# Patient Record
Sex: FEMALE | Race: WHITE | NOT HISPANIC OR LATINO | Employment: UNEMPLOYED | URBAN - METROPOLITAN AREA
[De-identification: names, ages, dates, MRNs, and addresses within clinical notes are randomized per-mention and may not be internally consistent; named-entity substitution may affect disease eponyms.]

---

## 2021-10-06 ENCOUNTER — HOSPITAL ENCOUNTER (OUTPATIENT)
Dept: GENERAL RADIOLOGY | Facility: HOSPITAL | Age: 56
Discharge: HOME | End: 2021-10-06
Admitting: FAMILY MEDICINE

## 2021-10-06 ENCOUNTER — TRANSCRIBE ORDERS (OUTPATIENT)
Dept: ADMINISTRATIVE | Facility: HOSPITAL | Age: 56
End: 2021-10-06

## 2021-10-06 ENCOUNTER — TRANSCRIBE ORDERS (OUTPATIENT)
Dept: MAMMOGRAPHY | Facility: HOSPITAL | Age: 56
End: 2021-10-06

## 2021-10-06 DIAGNOSIS — R05.8 PRODUCTIVE COUGH: Primary | ICD-10-CM

## 2021-10-06 DIAGNOSIS — R05.8 PRODUCTIVE COUGH: ICD-10-CM

## 2021-10-06 DIAGNOSIS — T85.848A PAIN DUE TO ANY DEVICE, IMPLANT OR GRAFT, INITIAL ENCOUNTER: Primary | ICD-10-CM

## 2021-10-06 PROCEDURE — 71046 X-RAY EXAM CHEST 2 VIEWS: CPT

## 2021-11-10 ENCOUNTER — HOSPITAL ENCOUNTER (OUTPATIENT)
Dept: ULTRASOUND IMAGING | Facility: HOSPITAL | Age: 56
Discharge: HOME | End: 2021-11-10

## 2021-11-10 ENCOUNTER — HOSPITAL ENCOUNTER (OUTPATIENT)
Dept: MAMMOGRAPHY | Facility: HOSPITAL | Age: 56
Discharge: HOME | End: 2021-11-10

## 2021-11-10 DIAGNOSIS — T85.848A PAIN DUE TO ANY DEVICE, IMPLANT OR GRAFT, INITIAL ENCOUNTER: ICD-10-CM

## 2021-11-10 PROCEDURE — 77066 DX MAMMO INCL CAD BI: CPT | Performed by: RADIOLOGY

## 2021-11-10 PROCEDURE — G0279 TOMOSYNTHESIS, MAMMO: HCPCS

## 2021-11-10 PROCEDURE — 76642 ULTRASOUND BREAST LIMITED: CPT | Performed by: RADIOLOGY

## 2021-11-10 PROCEDURE — 77062 BREAST TOMOSYNTHESIS BI: CPT | Performed by: RADIOLOGY

## 2021-11-10 PROCEDURE — 77066 DX MAMMO INCL CAD BI: CPT

## 2021-11-10 PROCEDURE — 76642 ULTRASOUND BREAST LIMITED: CPT

## 2024-03-28 ENCOUNTER — OFFICE VISIT (OUTPATIENT)
Dept: NEUROSURGERY | Facility: CLINIC | Age: 59
End: 2024-03-28
Payer: COMMERCIAL

## 2024-03-28 VITALS
DIASTOLIC BLOOD PRESSURE: 80 MMHG | SYSTOLIC BLOOD PRESSURE: 120 MMHG | WEIGHT: 101.6 LBS | TEMPERATURE: 97.7 F | BODY MASS INDEX: 18.58 KG/M2

## 2024-03-28 DIAGNOSIS — G93.0 ARACHNOID CYST: Primary | ICD-10-CM

## 2024-03-28 PROCEDURE — 99203 OFFICE O/P NEW LOW 30 MIN: CPT | Performed by: NEUROLOGICAL SURGERY

## 2024-03-28 RX ORDER — TRIAMCINOLONE ACETONIDE 0.1 %
1 PASTE (GRAM) DENTAL
COMMUNITY
Start: 2024-02-27

## 2024-03-28 RX ORDER — ROSUVASTATIN CALCIUM 20 MG/1
TABLET, COATED ORAL
COMMUNITY
Start: 2024-02-28

## 2024-03-28 RX ORDER — ESTRADIOL 0.1 MG/G
CREAM VAGINAL
COMMUNITY

## 2024-03-28 RX ORDER — MULTIVITAMIN
1 CAPSULE ORAL DAILY
COMMUNITY

## 2024-03-28 RX ORDER — DICYCLOMINE HYDROCHLORIDE 10 MG/1
CAPSULE ORAL DAILY
COMMUNITY

## 2024-03-28 RX ORDER — IBUPROFEN 200 MG
500 TABLET ORAL EVERY 6 HOURS PRN
COMMUNITY

## 2024-03-28 RX ORDER — VALACYCLOVIR HYDROCHLORIDE 500 MG/1
TABLET, FILM COATED ORAL
COMMUNITY
Start: 2024-02-27

## 2024-03-28 NOTE — PROGRESS NOTES
"  NAME: BUNNY DRIVER   DOS: 3/28/2024  : 1965  PCP: Jennifer Carrillo APRN    Chief Complaint:    Chief Complaint   Patient presents with    Headache       History of Present Illness:  58 y.o. female   Saw this 58-year-old female neurosurgical consultation.  She presents with a history of headaches she is moved here from [place] about 3 years ago during COVID with a job and has a longstanding history of some carpal tunnel and headaches but the presentation headache has gotten worse she has had more retro-orbital pain pain in the right occipital area with radiation into the right orbit she also has increasing numbness and tingling in the fingers and vibratory sensation are in her feet she intermittently takes ibuprofen she has been seen by neurology in the past and is here for evaluation for a \"arachnoid cyst \"    PMHX  Allergies:  No Known Allergies  Medications    Current Outpatient Medications:     cyanocobalamin (VITAMIN B-12) 50 MCG tablet, Take  by mouth., Disp: , Rfl:     dicyclomine (BENTYL) 10 MG capsule, Take  by mouth Daily., Disp: , Rfl:     estradiol (ESTRACE) 0.1 MG/GM vaginal cream, , Disp: , Rfl:     ibuprofen (ADVIL,MOTRIN) 200 MG tablet, Take 2.5 tablets by mouth Every 6 (Six) Hours As Needed., Disp: , Rfl:     Multiple Vitamin (multivitamin) capsule, Take 1 capsule by mouth Daily., Disp: , Rfl:     nystatin-triamcinolone (MYCOLOG II) 645000-7.1 UNIT/GM-% cream, , Disp: , Rfl:     rosuvastatin (CRESTOR) 20 MG tablet, Take  by mouth., Disp: , Rfl:     triamcinolone (KENALOG) 0.1 % paste, Apply 1 Application to teeth., Disp: , Rfl:     valACYclovir (VALTREX) 500 MG tablet, , Disp: , Rfl:     Zinc Gluconate 10 MG lozenge, Take 1 lozenge by mouth Daily., Disp: , Rfl:   Past Medical History:  Past Medical History:   Diagnosis Date    Headache      Past Surgical History:  Past Surgical History:   Procedure Laterality Date    AUGMENTATION MAMMAPLASTY       Social Hx:  Social History     Tobacco Use "    Smoking status: Former     Types: Cigarettes    Smokeless tobacco: Never   Vaping Use    Vaping status: Never Used   Substance Use Topics    Drug use: Never     Family Hx:  Family History   Problem Relation Age of Onset    Cancer Mother     Heart disease Father     Ovarian cancer Maternal Aunt     Breast cancer Maternal Grandmother      Review of Systems:        Review of Systems   Constitutional:  Negative for activity change, appetite change, chills, diaphoresis, fatigue, fever and unexpected weight change.   HENT:  Positive for congestion, mouth sores and postnasal drip. Negative for dental problem, drooling, ear discharge, ear pain, facial swelling, hearing loss, nosebleeds, rhinorrhea, sinus pressure, sinus pain, sneezing, sore throat, tinnitus, trouble swallowing and voice change.    Eyes:  Negative for photophobia, pain, discharge, redness, itching and visual disturbance.   Respiratory:  Negative for apnea, cough, choking, chest tightness, shortness of breath, wheezing and stridor.    Cardiovascular:  Negative for chest pain, palpitations and leg swelling.   Gastrointestinal:  Negative for abdominal distention, abdominal pain, anal bleeding, blood in stool, constipation, diarrhea, nausea, rectal pain and vomiting.   Endocrine: Negative for cold intolerance, heat intolerance, polydipsia, polyphagia and polyuria.   Genitourinary:  Positive for dyspareunia and frequency. Negative for decreased urine volume, difficulty urinating, dysuria, enuresis, flank pain, genital sores, hematuria and urgency.   Musculoskeletal:  Positive for back pain. Negative for arthralgias, gait problem, joint swelling, myalgias, neck pain and neck stiffness.   Skin:  Negative for color change, pallor, rash and wound.   Allergic/Immunologic: Negative for environmental allergies, food allergies and immunocompromised state.   Neurological:  Negative for dizziness, tremors, seizures, syncope, facial asymmetry, speech difficulty,  weakness, light-headedness, numbness and headaches.   Hematological:  Negative for adenopathy. Does not bruise/bleed easily.   Psychiatric/Behavioral:  Negative for agitation, behavioral problems, confusion, decreased concentration, dysphoric mood, hallucinations, self-injury, sleep disturbance and suicidal ideas. The patient is not nervous/anxious and is not hyperactive.       I have reviewed this note template and all pertinent parts of the review of systems social, family history, surgical history and medication list      Physical Examination:  Vitals:    03/28/24 1118   BP: 120/80   Temp: 97.7 °F (36.5 °C)      General Appearance:   Well developed, well nourished, well groomed, alert, and cooperative.  She appears in no apparent distress to stated age  Neurological examination:  Neurologic Exam vital signs were reviewed and documented in the chart  Patient appeared in good neurologic function with normal comprehension fluent speech  Mood and affect are normal  Sense of smell deferred    Pupils symmetric equally reactive funduscopic exam not visualized   Visual fields intact to confrontation  Extraocular movements intact  Face motor function is symmetric  Facial sensations normal speech is fluent no slurring  Hearing intact to finger rub hearing intact to finger rub  Tongue is midline  Palate symmetric  Swallowing normal  Shoulder shrug normal    Muscle bulk and tone normal  5 out of 5 strength no motor drift  Her balance is okay    She certainly did not have any hyperreflexia or clonus myelopathy no Tinel's noted hands are warm and well-perfused slight stigmata arthritis          Review of Imaging/DATA:  MRI was personally reviewed elected all the imaging personally I do not see any evidence of a large arachnoid cyst there could be some artifactual changes in the parietal area or duplication arachnoid but certainly nothing that require surgery.  MR venogram appears okay there is a pancuronium granulation as a  variant of normal in the sagittal sinus with good flow signal all the way through MRA was unremarkable as well  Diagnoses/Plan:    Ms. Lopez is a 58 y.o. female   1.  Headaches right-sided retro-orbital multiple etiologies probably component neurology etc.  2.  Numbness hands some vibratory sensation issues in the feet she has no clonus long tract signs or myelopathic findings and I doubt this would be related to anything in the cervical spine but obviously if symptoms persist I recommended referral to a neurologist MRIs etc. also urged her that if things got worse or progressed and we could get to the bottom of the necessary because of this other than common things but have a low threshold just to repeat things and would be happy to review things down the road but it is doubtful that anything based on what I saw on her scans today should become surgical in nature    It would be a pleasure to see her back anytime in the future